# Patient Record
Sex: FEMALE | Race: OTHER | HISPANIC OR LATINO | Employment: FULL TIME | URBAN - METROPOLITAN AREA
[De-identification: names, ages, dates, MRNs, and addresses within clinical notes are randomized per-mention and may not be internally consistent; named-entity substitution may affect disease eponyms.]

---

## 2019-11-14 ENCOUNTER — OFFICE VISIT (OUTPATIENT)
Dept: PULMONOLOGY | Facility: MEDICAL CENTER | Age: 39
End: 2019-11-14
Payer: COMMERCIAL

## 2019-11-14 VITALS
BODY MASS INDEX: 37.15 KG/M2 | DIASTOLIC BLOOD PRESSURE: 82 MMHG | SYSTOLIC BLOOD PRESSURE: 122 MMHG | HEIGHT: 65 IN | HEART RATE: 78 BPM | OXYGEN SATURATION: 97 % | TEMPERATURE: 97.7 F | WEIGHT: 223 LBS | RESPIRATION RATE: 13 BRPM

## 2019-11-14 DIAGNOSIS — J45.30 MILD PERSISTENT ASTHMA WITHOUT COMPLICATION: Primary | ICD-10-CM

## 2019-11-14 DIAGNOSIS — R06.02 SOB (SHORTNESS OF BREATH): ICD-10-CM

## 2019-11-14 DIAGNOSIS — R06.2 WHEEZE: ICD-10-CM

## 2019-11-14 DIAGNOSIS — J30.1 SEASONAL ALLERGIC RHINITIS DUE TO POLLEN: ICD-10-CM

## 2019-11-14 PROCEDURE — 99204 OFFICE O/P NEW MOD 45 MIN: CPT | Performed by: INTERNAL MEDICINE

## 2019-11-14 PROCEDURE — 94010 BREATHING CAPACITY TEST: CPT | Performed by: INTERNAL MEDICINE

## 2019-11-14 PROCEDURE — 94640 AIRWAY INHALATION TREATMENT: CPT | Performed by: INTERNAL MEDICINE

## 2019-11-14 RX ORDER — MOMETASONE FUROATE 50 UG/1
2 SPRAY, METERED NASAL
Qty: 1 ACT | Refills: 7 | Status: SHIPPED | OUTPATIENT
Start: 2019-11-14 | End: 2020-08-14 | Stop reason: SDUPTHER

## 2019-11-14 RX ORDER — ALBUTEROL SULFATE 2.5 MG/3ML
2.5 SOLUTION RESPIRATORY (INHALATION) ONCE
Status: COMPLETED | OUTPATIENT
Start: 2019-11-14 | End: 2019-11-14

## 2019-11-14 RX ORDER — ALBUTEROL SULFATE 2.5 MG/3ML
2.5 SOLUTION RESPIRATORY (INHALATION) EVERY 4 HOURS PRN
Qty: 120 VIAL | Refills: 5 | Status: SHIPPED | OUTPATIENT
Start: 2019-11-14 | End: 2019-12-14

## 2019-11-14 RX ORDER — ALBUTEROL SULFATE 90 UG/1
AEROSOL, METERED RESPIRATORY (INHALATION)
COMMUNITY
Start: 2019-06-10 | End: 2020-08-14 | Stop reason: SDUPTHER

## 2019-11-14 RX ORDER — DESLORATADINE 5 MG/1
5 TABLET ORAL DAILY PRN
Qty: 30 TABLET | Refills: 5 | Status: SHIPPED | OUTPATIENT
Start: 2019-11-14 | End: 2020-08-14 | Stop reason: SDUPTHER

## 2019-11-14 RX ADMIN — ALBUTEROL SULFATE 2.5 MG: 2.5 SOLUTION RESPIRATORY (INHALATION) at 09:50

## 2019-11-14 NOTE — ASSESSMENT & PLAN NOTE
Mild persistent asthma with increased wheezing and need for inhaler therapy over the past month  This may be triggered by allergies  No evidence of acute infection  She has been on QVAR 80 mcg 2 puffs b i d  But she does not feel this is been effective for her and she has been needing use a Ventolin inhaler several times a day  Spirometry today shows normal lung volumes  Peak flow rate today was 500 liters/minute pre bronchodilator and predicted is 480 liters/minute  After nebulizer treatment with 2 5 mg of albuterol this increased to 550 liters/minute  Also she had faint wheezing in the lower lobes prior to bronchodilator and after the nebulizer treatment this resolved and she felt she can take a deeper breath    I discontinued her QVAR and will switch her to Advair 113 mcg MDI - 2 puffs twice a day  I did advise her to rinse her mouth after use  She prefers meter dose inhalers as opposed to powdered inhalers    I did order her a nebulizer and she can use 2 5 mg albuterol every 4 hours when needed or she will use her Ventolin inhaler  She does not recall being on, steroid therapy recently  I did give her option of that if she was not improving I could give her a short course of prednisone therapy  This time she she did not feel she needed it  If she does not improve her changes on my she will contact our office    Follow-up in 6 weeks  I did provide her a peak flow meter to monitor her peak flows at home    I also instructed her in how to use the peak flow meter

## 2019-11-14 NOTE — ASSESSMENT & PLAN NOTE
There is some audible wheezing in the lower lobes today  I gave nebulized treatment with albuterol 2 5 mg and this resolved  I did order her a home nebulizer and albuterol for the nebulizer  When she has any increased shortness of breath at home between nighttime she can use the nebulizer

## 2019-11-14 NOTE — PATIENT INSTRUCTIONS
Stopped using QVAR    Start Advair 113 mcg inhaler - 2 puffs twice a day  Use Ventolin inhaler 2 puffs every 4 hours as needed    Peak flow meter  Can check peak flows if you have any shortness of breath  Predicted peak flow is 480 liters/minute  Today your peak flow was 500      After the nebulizer treatment with albuterol the peak flow rate improved to 550    I will order nebulizer that you can use 1 vial of albuterol every 4 hours as needed if you have wheezing or difficulty breathing    I ordered nebulizer from [facility]     Take Clarinex 1 tablet daily as needed for allergies    Use Nasonex nasal spray 2 sprays in each nostril at bedtime for nasal congestion and allergies

## 2019-11-14 NOTE — PROGRESS NOTES
Assessment/Plan:       Problem List Items Addressed This Visit        Respiratory    Mild persistent asthma without complication - Primary     Mild persistent asthma with increased wheezing and need for inhaler therapy over the past month  This may be triggered by allergies  No evidence of acute infection  She has been on QVAR 80 mcg 2 puffs b i d  But she does not feel this is been effective for her and she has been needing use a Ventolin inhaler several times a day  Spirometry today shows normal lung volumes  Peak flow rate today was 500 liters/minute pre bronchodilator and predicted is 480 liters/minute  After nebulizer treatment with 2 5 mg of albuterol this increased to 550 liters/minute  Also she had faint wheezing in the lower lobes prior to bronchodilator and after the nebulizer treatment this resolved and she felt she can take a deeper breath    I discontinued her QVAR and will switch her to Advair 113 mcg MDI - 2 puffs twice a day  I did advise her to rinse her mouth after use  She prefers meter dose inhalers as opposed to powdered inhalers    I did order her a nebulizer and she can use 2 5 mg albuterol every 4 hours when needed or she will use her Ventolin inhaler  She does not recall being on, steroid therapy recently  I did give her option of that if she was not improving I could give her a short course of prednisone therapy  This time she she did not feel she needed it  If she does not improve her changes on my she will contact our office    Follow-up in 6 weeks  I did provide her a peak flow meter to monitor her peak flows at home    I also instructed her in how to use the peak flow meter         Relevant Medications    albuterol (VENTOLIN HFA) 90 mcg/act inhaler    albuterol inhalation solution 2 5 mg (Completed)    fluticasone-salmeterol (ADVAIR HFA) 115-21 MCG/ACT inhaler    albuterol (2 5 mg/3 mL) 0 083 % nebulizer solution    Other Relevant Orders    Nebulizer    Seasonal allergic rhinitis due to pollen     I prescribed Nasonex nasal spray 2 sprays each nostril as needed for her nasal congestion and postnasal drainage  She has used fluticasone nasal spray in the past with benefit  Also I did prescribe Clarinex 5 mg 1 tablet daily  She was uses as needed for allergy symptoms  Relevant Medications    desloratadine (CLARINEX) 5 MG tablet    mometasone (NASONEX) 50 mcg/act nasal spray       Other    Wheeze     There is some audible wheezing in the lower lobes today  I gave nebulized treatment with albuterol 2 5 mg and this resolved  I did order her a home nebulizer and albuterol for the nebulizer  When she has any increased shortness of breath at home between nighttime she can use the nebulizer  Relevant Medications    albuterol inhalation solution 2 5 mg (Completed)      Other Visit Diagnoses     SOB (shortness of breath)        Relevant Orders    POCT spirometry            Return in about 6 weeks (around 12/26/2019)  All questions are answered to the patient's satisfaction and understanding  She verbalizes understanding  She is encouraged to call with any further questions or concerns  Portions of the record may have been created with voice recognition software  Occasional wrong word or "sound a like" substitutions may have occurred due to the inherent limitations of voice recognition software  Read the chart carefully and recognize, using context, where substitutions have occurred  a    Electronically Signed by Samra November, DO    ______________________________________________________________________    Chief Complaint:   Chief Complaint   Patient presents with    Asthma     pt is here for today for consult     Shortness of Breath     pt states very  bad   Cough     dry     Wheezing        Patient ID: Anai Mendez is a 42 y o  y o  female has a past medical history of Asthma  11/14/2019  Patient presents today for initial visit    HPI    Anai Agent presents for initial evaluation for her asthma  She spent weeks very little initial and she was accompanied by her  today who helped translate  She is from [facility]  She states she has had asthma since she was 12years old  She has always been on some inhalers  Recently over the past month she has had increased wheezing and shortness of Breath intermittently  She has been using her Ventolin inhaler now several times per day over the past week or more  She does have seasonal allergies and is having some nasal congestion and postnasal drainage  She also is waking up at night now with some wheezing and sometimes shortness of breath  She does have periodic nonproductive cough  She does have fullness sensation in her ears  She used to own a dog and denies any allergies to dogs  No allergies to cats  She is having some mild shortness of breath with exertional activity  She presently is using QVAR 80 mcg 2 puffs twice a day  She does have the meter dose version acute far this was prescribed year ago  She also has a Ventolin inhaler  She does not have a nebulizer  She denies any history of heart disease  No history of kidney disease or diabetes mellitus  She denies any excessive daytime somnolence  Pets/Enviroment:  No pets    Her primary physician is in [place] her at [employer] care    Review of Systems   Constitutional: Negative for activity change, appetite change, fatigue and fever  HENT: Positive for congestion and postnasal drip  Eyes: Positive for itching  Respiratory: Positive for shortness of breath and wheezing  Cardiovascular: Negative for chest pain, palpitations and leg swelling  Gastrointestinal: Negative for abdominal distention and abdominal pain  Endocrine: Negative for polydipsia and polyphagia  Musculoskeletal: Negative for joint swelling  Neurological: Negative for light-headedness  Psychiatric/Behavioral: Negative for decreased concentration  Social history: She reports that she has never smoked  She has never used smokeless tobacco  She reports that she drinks alcohol  She reports that she does not use drugs  Past surgical history:   Past Surgical History:   Procedure Laterality Date    APPENDECTOMY       SECTION       x2    LAPAROSCOPIC OVARIAN CYSTECTOMY       Family history:   Family History   Problem Relation Age of Onset    Hypertension Mother     Diabetes Mother     Asthma Mother     Hypertension Father     Diabetes Father          There is no immunization history on file for this patient  Current Outpatient Medications   Medication Sig Dispense Refill    albuterol (VENTOLIN HFA) 90 mcg/act inhaler inhale 2 puffs by mouth every 4 hours      albuterol (2 5 mg/3 mL) 0 083 % nebulizer solution Take 1 vial (2 5 mg total) by nebulization every 4 (four) hours as needed for wheezing or shortness of breath 120 vial 5    desloratadine (CLARINEX) 5 MG tablet Take 1 tablet (5 mg total) by mouth daily as needed (Allergies) 30 tablet 5    fluticasone-salmeterol (ADVAIR HFA) 115-21 MCG/ACT inhaler Inhale 2 puffs 2 (two) times a day Rinse mouth after use  1 Inhaler 7    mometasone (NASONEX) 50 mcg/act nasal spray 2 sprays into each nostril daily at bedtime as needed (Nasal congestion or postnasal drainage) 1 Act 7     No current facility-administered medications for this visit  Allergies: Patient has no known allergies  Objective:  Vitals:    19 0905   BP: 122/82   BP Location: Left arm   Patient Position: Sitting   Cuff Size: Extra-Large   Pulse: 78   Resp: 13   Temp: 97 7 °F (36 5 °C)   TempSrc: Tympanic   SpO2: 97%   Weight: 101 kg (223 lb)   Height: 5' 5 25" (1 657 m)   Oxygen Therapy  SpO2: 97 %    Wt Readings from Last 3 Encounters:   19 101 kg (223 lb)     Body mass index is 36 83 kg/m²  Physical Exam   Constitutional: She is oriented to person, place, and time   She appears well-developed and well-nourished  No distress  overweight   HENT:   Head: Normocephalic  Nose: Nose normal    Mouth/Throat: Oropharynx is clear and moist  No oropharyngeal exudate  To manic membranes normal in color  Small amount of fluid behind right ear drum  Mild cobblestoning of mucosa of pharnx   Eyes: Pupils are equal, round, and reactive to light  Conjunctivae are normal    Neck: Neck supple  No JVD present  No tracheal deviation present  Cardiovascular: Normal rate, regular rhythm and normal heart sounds  Pulmonary/Chest: Effort normal    There are some faint expiratory wheezes in the lower lobes  No crackles or rhonchi   Abdominal: Soft  She exhibits no distension  There is no tenderness  There is no guarding  Musculoskeletal: She exhibits no edema  No edema of lower extremities   Lymphadenopathy:     She has no cervical adenopathy  Neurological: She is alert and oriented to person, place, and time  Skin: Skin is warm and dry  No rash noted  Psychiatric: She has a normal mood and affect   Her behavior is normal  Thought content normal      Mini neb  Performed by: Mark Navarro DO  Authorized by: Mark Navarro DO     Treatment 1:   Pre-Procedure     Symptoms:  Wheezing    Lung Sounds:  Faint wheezing in lower lobes    HR:  70    RR:  12    SP02:  98%    Medication Administered:  Albuterol 2 5 mg  Post-Procedure     Lung sounds:  Lung sounds were clear    HR:  70    RR:  12    SP02:  98%      Peak flow rate was 500 liters/minute before nebulized treatment with albuterol and after nebulizer treatment was 550 liters/minute      Office Spirometry Results:  Pre bronchodilator  FVC - 3 54 L   92%  FEV1 - 3 38 L  106%  FEV1/FVC% - 96%

## 2019-11-14 NOTE — ASSESSMENT & PLAN NOTE
I prescribed Nasonex nasal spray 2 sprays each nostril as needed for her nasal congestion and postnasal drainage  She has used fluticasone nasal spray in the past with benefit  Also I did prescribe Clarinex 5 mg 1 tablet daily  She was uses as needed for allergy symptoms

## 2020-01-06 ENCOUNTER — OFFICE VISIT (OUTPATIENT)
Dept: PULMONOLOGY | Facility: MEDICAL CENTER | Age: 40
End: 2020-01-06
Payer: COMMERCIAL

## 2020-01-06 VITALS
BODY MASS INDEX: 28.66 KG/M2 | HEART RATE: 84 BPM | SYSTOLIC BLOOD PRESSURE: 128 MMHG | RESPIRATION RATE: 12 BRPM | HEIGHT: 65 IN | DIASTOLIC BLOOD PRESSURE: 72 MMHG | OXYGEN SATURATION: 98 % | TEMPERATURE: 97.7 F | WEIGHT: 172 LBS

## 2020-01-06 DIAGNOSIS — J20.9 ACUTE BRONCHITIS, UNSPECIFIED ORGANISM: Primary | ICD-10-CM

## 2020-01-06 DIAGNOSIS — J45.30 MILD PERSISTENT ASTHMA WITHOUT COMPLICATION: ICD-10-CM

## 2020-01-06 DIAGNOSIS — R05.9 COUGH: ICD-10-CM

## 2020-01-06 DIAGNOSIS — R06.2 WHEEZE: ICD-10-CM

## 2020-01-06 PROCEDURE — 94640 AIRWAY INHALATION TREATMENT: CPT | Performed by: INTERNAL MEDICINE

## 2020-01-06 PROCEDURE — 99214 OFFICE O/P EST MOD 30 MIN: CPT | Performed by: INTERNAL MEDICINE

## 2020-01-06 RX ORDER — PREDNISONE 20 MG/1
20 TABLET ORAL 2 TIMES DAILY WITH MEALS
Qty: 10 TABLET | Refills: 0 | Status: SHIPPED | OUTPATIENT
Start: 2020-01-06 | End: 2020-01-06 | Stop reason: ALTCHOICE

## 2020-01-06 RX ORDER — IPRATROPIUM BROMIDE AND ALBUTEROL SULFATE 2.5; .5 MG/3ML; MG/3ML
3 SOLUTION RESPIRATORY (INHALATION) ONCE
Status: COMPLETED | OUTPATIENT
Start: 2020-01-06 | End: 2020-01-06

## 2020-01-06 RX ORDER — AZITHROMYCIN 250 MG/1
TABLET, FILM COATED ORAL
Qty: 6 TABLET | Refills: 0 | Status: SHIPPED | OUTPATIENT
Start: 2020-01-06 | End: 2020-01-10

## 2020-01-06 RX ORDER — PREDNISONE 20 MG/1
20 TABLET ORAL DAILY
Qty: 10 TABLET | Refills: 0 | Status: SHIPPED | OUTPATIENT
Start: 2020-01-06 | End: 2020-01-06

## 2020-01-06 RX ADMIN — IPRATROPIUM BROMIDE AND ALBUTEROL SULFATE 3 ML: 2.5; .5 SOLUTION RESPIRATORY (INHALATION) at 16:01

## 2020-01-06 NOTE — PROGRESS NOTES
Assessment/Plan        Problem List Items Addressed This Visit        Respiratory    Mild persistent asthma without complication     She is doing well with Advair  mcg will continue 2 puffs b i d  She can use her new albuterol inhaler 2 puffs every 4 hours as needed  This time she does not appear to need any oral corticosteroid therapy  She does not improve she will contact our office  She does have a peak flow meter at home to monitor her peak flow rates         Acute bronchitis, unspecified - Primary     I prescribed a 5 day course of azithromycin for her acute bronchitis  She will contact our office if she has no improvement            Other    Wheeze     She had mild expiratory wheezes in both bases  I did administer nebulized treatment DuoNeb with improvement             Relevant Orders    Mini neb (Completed)      Other Visit Diagnoses     Cough                Shortness of Breath ( very bad ); Cough (productive ); and Wheezing      HPI     Patient presents with one week duration of URI symptoms including productive cough, chills, sore throat, subjective fever  Patient tried Nyquil with partial relief  Patient does have mild persistent asthma  She is now using Advair  mcg 2 puffs b i d  Previously she had use QVAR  She prefers meter dose inhalers as compared to powdered inhalers  She also has history of seasonal allergic rhinitis  She does have Clarinex Nasonex she can use for this when needed  She was accompanied by her  today  She speaks mostly [place]  She is from the [facility]  He is having some mild shortness of breath with activity recently since her URI  Prior to that she had been doing very well on Advair inhaler  No fever 0r chills  She also complains some slight year discomfort bilaterally  Patient received Duoneb x1 at clinic today      Past Medical History:   Diagnosis Date    Asthma        Past Surgical History:   Procedure Laterality Date  APPENDECTOMY       SECTION       x2    LAPAROSCOPIC OVARIAN CYSTECTOMY           Current Outpatient Medications:     albuterol (VENTOLIN HFA) 90 mcg/act inhaler, inhale 2 puffs by mouth every 4 hours, Disp: , Rfl:     desloratadine (CLARINEX) 5 MG tablet, Take 1 tablet (5 mg total) by mouth daily as needed (Allergies) (Patient not taking: Reported on 2020), Disp: 30 tablet, Rfl: 5    fluticasone-salmeterol (ADVAIR HFA) 115-21 MCG/ACT inhaler, Inhale 2 puffs 2 (two) times a day Rinse mouth after use , Disp: 1 Inhaler, Rfl: 7    mometasone (NASONEX) 50 mcg/act nasal spray, 2 sprays into each nostril daily at bedtime as needed (Nasal congestion or postnasal drainage) (Patient not taking: Reported on 2020), Disp: 1 Act, Rfl: 7    cloNIDine (CATAPRES) 0 1 mg tablet, Take 0 1 mg by mouth 2 (two) times a day, Disp: , Rfl:     enoxaparin (LOVENOX) 40 mg/0 4 mL, , Disp: , Rfl:     ipratropium-albuterol (DUO-NEB) 0 5-2 5 mg/3 mL nebulizer solution, Take 3 mL by nebulization 4 (four) times a day, Disp: , Rfl:     ondansetron (ZOFRAN-ODT) 4 mg disintegrating tablet, , Disp: , Rfl:     pantoprazole (PROTONIX) 40 mg tablet, Take 40 mg by mouth, Disp: , Rfl:     scopolamine (TRANSDERM-SCOP) 1 5 mg/3 days TD 72 hr patch, , Disp: , Rfl:     No Known Allergies    Social History     Tobacco Use    Smoking status: Never Smoker    Smokeless tobacco: Never Used   Substance Use Topics    Alcohol use: Yes         Family History   Problem Relation Age of Onset    Hypertension Mother     Diabetes Mother     Asthma Mother     Hypertension Father     Diabetes Father        Review of Systems   Constitutional: Positive for chills  Negative for activity change, appetite change and fever  HENT: Positive for congestion and sore throat  Mild discomfort in her ears   Eyes: Negative for redness  Respiratory: Positive for cough and wheezing      Cardiovascular: Negative for chest pain and leg swelling  Gastrointestinal: Negative for abdominal distention and abdominal pain  Endocrine: Negative for polydipsia and polyphagia  Genitourinary: Negative for dysuria  Musculoskeletal: Negative for arthralgias  Neurological: Negative for light-headedness  Vitals:    01/06/20 1533   BP: 128/72   Pulse: 84   Resp: 12   Temp: 97 7 °F (36 5 °C)   SpO2: 98%             Physical Exam   Constitutional: She is oriented to person, place, and time  She appears well-developed and well-nourished  No distress  HENT:   Head: Normocephalic  Nose: Nose normal    Mouth/Throat: Oropharynx is clear and moist  No oropharyngeal exudate  TM non-bulging, light reflex present b/l  Eyes: Pupils are equal, round, and reactive to light  Conjunctivae are normal    Cardiovascular: Normal rate, regular rhythm and normal heart sounds  Pulmonary/Chest: Effort normal    Expiratory wheezing in bilateral basal lung fields  Abdominal: Soft  She exhibits no distension  There is no tenderness  Musculoskeletal:   No edema   Neurological: She is alert and oriented to person, place, and time  Skin: Skin is warm and dry  Psychiatric: She has a normal mood and affect         Mini neb  Performed by: Karen Luna DO  Authorized by: Karen Luna DO     Number of treatments:  1  Treatment 1:   Pre-Procedure     Symptoms:  Wheezing and cough    Lung Sounds:  Few faint expiratory wheezes in lower lobes    HR:  70    RR:  14    SP02:  97%    Medication Administered:  Duoneb - Albuterol 2 5 mg/Atrovent 0 5 mg  Post-Procedure     Lung sounds:  Clear    HR:  72    RR:  14

## 2020-01-14 PROBLEM — J20.9 ACUTE BRONCHITIS, UNSPECIFIED: Status: ACTIVE | Noted: 2020-01-14

## 2020-01-15 NOTE — ASSESSMENT & PLAN NOTE
I prescribed a 5 day course of azithromycin for her acute bronchitis    She will contact our office if she has no improvement

## 2020-01-15 NOTE — ASSESSMENT & PLAN NOTE
She is doing well with Advair  mcg will continue 2 puffs b i d  She can use her new albuterol inhaler 2 puffs every 4 hours as needed  This time she does not appear to need any oral corticosteroid therapy  She does not improve she will contact our office    She does have a peak flow meter at home to monitor her peak flow rates

## 2020-01-15 NOTE — ASSESSMENT & PLAN NOTE
She had mild expiratory wheezes in both bases    I did administer nebulized treatment DuoNeb with improvement

## 2020-08-14 ENCOUNTER — OFFICE VISIT (OUTPATIENT)
Dept: PULMONOLOGY | Facility: MEDICAL CENTER | Age: 40
End: 2020-08-14
Payer: COMMERCIAL

## 2020-08-14 VITALS
HEIGHT: 65 IN | SYSTOLIC BLOOD PRESSURE: 100 MMHG | RESPIRATION RATE: 12 BRPM | BODY MASS INDEX: 30.82 KG/M2 | HEART RATE: 72 BPM | WEIGHT: 185 LBS | OXYGEN SATURATION: 98 % | DIASTOLIC BLOOD PRESSURE: 64 MMHG | TEMPERATURE: 98.7 F

## 2020-08-14 DIAGNOSIS — J30.1 SEASONAL ALLERGIC RHINITIS DUE TO POLLEN: ICD-10-CM

## 2020-08-14 DIAGNOSIS — E66.09 CLASS 1 OBESITY DUE TO EXCESS CALORIES WITHOUT SERIOUS COMORBIDITY WITH BODY MASS INDEX (BMI) OF 30.0 TO 30.9 IN ADULT: ICD-10-CM

## 2020-08-14 DIAGNOSIS — J45.30 MILD PERSISTENT ASTHMA WITHOUT COMPLICATION: Primary | ICD-10-CM

## 2020-08-14 PROCEDURE — 99214 OFFICE O/P EST MOD 30 MIN: CPT | Performed by: INTERNAL MEDICINE

## 2020-08-14 RX ORDER — ALBUTEROL SULFATE 90 UG/1
2 AEROSOL, METERED RESPIRATORY (INHALATION) EVERY 4 HOURS
Qty: 18 G | Refills: 11 | Status: SHIPPED | OUTPATIENT
Start: 2020-08-14 | End: 2020-08-14

## 2020-08-14 RX ORDER — PANTOPRAZOLE SODIUM 40 MG/1
40 TABLET, DELAYED RELEASE ORAL
COMMUNITY
Start: 2020-06-04 | End: 2021-06-04

## 2020-08-14 RX ORDER — IPRATROPIUM BROMIDE AND ALBUTEROL SULFATE 2.5; .5 MG/3ML; MG/3ML
3 SOLUTION RESPIRATORY (INHALATION) 4 TIMES DAILY
COMMUNITY

## 2020-08-14 RX ORDER — SCOLOPAMINE TRANSDERMAL SYSTEM 1 MG/1
PATCH, EXTENDED RELEASE TRANSDERMAL
COMMUNITY
Start: 2020-06-01

## 2020-08-14 RX ORDER — MOMETASONE FUROATE 50 UG/1
2 SPRAY, METERED NASAL
Qty: 1 ACT | Refills: 7 | Status: SHIPPED | OUTPATIENT
Start: 2020-08-14

## 2020-08-14 RX ORDER — ONDANSETRON 4 MG/1
TABLET, ORALLY DISINTEGRATING ORAL
COMMUNITY
Start: 2020-05-30

## 2020-08-14 RX ORDER — ALBUTEROL SULFATE 90 UG/1
2 AEROSOL, METERED RESPIRATORY (INHALATION) EVERY 4 HOURS
Qty: 18 G | Refills: 11 | Status: SHIPPED | OUTPATIENT
Start: 2020-08-14

## 2020-08-14 RX ORDER — CLONIDINE HYDROCHLORIDE 0.1 MG/1
0.1 TABLET ORAL 2 TIMES DAILY
COMMUNITY
Start: 2020-03-04

## 2020-08-14 RX ORDER — DESLORATADINE 5 MG/1
5 TABLET ORAL DAILY PRN
Qty: 30 TABLET | Refills: 5 | Status: SHIPPED | OUTPATIENT
Start: 2020-08-14

## 2020-08-14 NOTE — PROGRESS NOTES
Assessment/Plan        Problem List Items Addressed This Visit        Respiratory    Mild persistent asthma without complication - Primary     Continue Advair 115 mcg 2 puffs b i d  Her asthma seems to be well controlled at this time  Lung sounds were clear today  She states she had difficulty wearing mask at her [employer] workplace she gets hot from it  I told her I could not give any specific medical reason why she could not work because of this  She would like to take a leave of absence however at for company water loud I did a give a no supporting this         Relevant Medications    ipratropium-albuterol (DUO-NEB) 0 5-2 5 mg/3 mL nebulizer solution    fluticasone-salmeterol (ADVAIR HFA) 115-21 MCG/ACT inhaler    albuterol (Ventolin HFA) 90 mcg/act inhaler    Seasonal allergic rhinitis due to pollen     She does use Nasonex periodically for her allergic rhinitis  Relevant Medications    desloratadine (CLARINEX) 5 MG tablet    mometasone (NASONEX) 50 mcg/act nasal spray       Other    Class 1 obesity due to excess calories without serious comorbidity with body mass index (BMI) of 30 0 to 30 9 in adult     She did have laparoscopic sleeve gastrectomy done for weight loss on Daria 3, 2020 and had liver biopsy done at [facility] in [place], [place]  Shortness of Breath (while wearing mask)      HPI      Patient is presenting with a complaint regarding difficult wearing a mask at work  Patient works in packaging at [employer]  She states after a while she starts to feel short of breath while she wears her mask  She was accompanied by her  who helped translate for her as patient's English limited    Yamileth Heath has mild persistent asthma and is using Advair  mcg 2 puffs b i d  And albuterol inhaler as needed  She is not have any wheezing or nocturnal dyspnea  No recent asthma exacerbations      She did undergo laparoscopic sleeve gastrectomy on Daria 3, 2020 also had liver biopsy done  This was done at [facility] in [place], [place]  Liver biopsy was unremarkable      Past Medical History:   Diagnosis Date    Asthma        Past Surgical History:   Procedure Laterality Date    APPENDECTOMY       SECTION       x2    GASTRECTOMY SLEEVE LAPAROSCOPIC  2020    Done at [facility] in [place], [address] [place] [address]           Current Outpatient Medications:     albuterol (Ventolin HFA) 90 mcg/act inhaler, Inhale 2 puffs every 4 (four) hours, Disp: 18 g, Rfl: 11    fluticasone-salmeterol (ADVAIR HFA) 115-21 MCG/ACT inhaler, Inhale 2 puffs 2 (two) times a day Rinse mouth after use , Disp: 1 Inhaler, Rfl: 11    ipratropium-albuterol (DUO-NEB) 0 5-2 5 mg/3 mL nebulizer solution, Take 3 mL by nebulization 4 (four) times a day, Disp: , Rfl:     cloNIDine (CATAPRES) 0 1 mg tablet, Take 0 1 mg by mouth 2 (two) times a day, Disp: , Rfl:     desloratadine (CLARINEX) 5 MG tablet, Take 1 tablet (5 mg total) by mouth daily as needed (Allergies), Disp: 30 tablet, Rfl: 5    enoxaparin (LOVENOX) 40 mg/0 4 mL, , Disp: , Rfl:     mometasone (NASONEX) 50 mcg/act nasal spray, 2 sprays into each nostril daily at bedtime as needed (Nasal congestion or postnasal drainage), Disp: 1 Act, Rfl: 7    ondansetron (ZOFRAN-ODT) 4 mg disintegrating tablet, , Disp: , Rfl:     pantoprazole (PROTONIX) 40 mg tablet, Take 40 mg by mouth, Disp: , Rfl:     scopolamine (TRANSDERM-SCOP) 1 5 mg/3 days TD 72 hr patch, , Disp: , Rfl:     No Known Allergies    Social History     Tobacco Use    Smoking status: Never Smoker    Smokeless tobacco: Never Used   Substance Use Topics    Alcohol use: Yes         Family History   Problem Relation Age of Onset    Hypertension Mother     Diabetes Mother     Asthma Mother     Hypertension Father     Diabetes Father        Review of Systems   Constitutional: Negative for chills, fever and unexpected weight change  HENT: Negative for congestion, rhinorrhea and sore throat  Eyes: Negative for discharge and redness  Respiratory: Negative for cough and wheezing  Cardiovascular: Negative for chest pain, palpitations and leg swelling  Gastrointestinal: Negative for abdominal distention, abdominal pain and nausea  Endocrine: Negative for polydipsia and polyphagia  Genitourinary: Negative for dysuria and hematuria  Musculoskeletal: Negative for joint swelling and myalgias  Skin: Negative for rash  Neurological: Negative for light-headedness  Psychiatric/Behavioral: Negative for decreased concentration  Vitals:    08/14/20 0931   BP: 100/64   Pulse: 72   Resp: 12   Temp: 98 7 °F (37 1 °C)   SpO2: 98%           Physical Exam  Constitutional:       General: She is not in acute distress  Appearance: She is well-developed  HENT:      Head: Normocephalic  Nose: Nose normal       Mouth/Throat:      Pharynx: No oropharyngeal exudate  Eyes:      Conjunctiva/sclera: Conjunctivae normal       Pupils: Pupils are equal, round, and reactive to light  Neck:      Musculoskeletal: Neck supple  Vascular: No JVD  Trachea: No tracheal deviation  Cardiovascular:      Rate and Rhythm: Normal rate and regular rhythm  Heart sounds: Normal heart sounds  Pulmonary:      [place]: Pulmonary effort is normal       Comments: Lung sounds are clear without any wheezes, crackles or rhonchi  Abdominal:      General: There is no distension  Palpations: Abdomen is soft  Tenderness: There is no abdominal tenderness  Musculoskeletal:      Comments: No edema, cyanosis or clubbing   Lymphadenopathy:      Cervical: No cervical adenopathy  Skin:     General: Skin is warm and dry  Neurological:      Mental Status: She is alert and oriented to person, place, and time

## 2020-08-15 PROBLEM — E66.09 CLASS 1 OBESITY DUE TO EXCESS CALORIES WITHOUT SERIOUS COMORBIDITY WITH BODY MASS INDEX (BMI) OF 30.0 TO 30.9 IN ADULT: Status: ACTIVE | Noted: 2020-08-15

## 2020-08-16 NOTE — ASSESSMENT & PLAN NOTE
Continue Advair 115 mcg 2 puffs b i d  Her asthma seems to be well controlled at this time  Lung sounds were clear today  She states she had difficulty wearing mask at her [employer] workplace she gets hot from it  I told her I could not give any specific medical reason why she could not work because of this    She would like to take a leave of absence however at for [employer] water loud I did a give a no supporting this

## 2020-08-16 NOTE — ASSESSMENT & PLAN NOTE
She did have laparoscopic sleeve gastrectomy done for weight loss on Daria 3, 2020 and had liver biopsy done at [facility] in [place], [place]

## 2022-06-20 ENCOUNTER — APPOINTMENT (EMERGENCY)
Dept: RADIOLOGY | Facility: HOSPITAL | Age: 42
End: 2022-06-20
Payer: OTHER MISCELLANEOUS

## 2022-06-20 ENCOUNTER — HOSPITAL ENCOUNTER (EMERGENCY)
Facility: HOSPITAL | Age: 42
Discharge: HOME | End: 2022-06-20
Attending: EMERGENCY MEDICINE
Payer: OTHER MISCELLANEOUS

## 2022-06-20 VITALS
BODY MASS INDEX: 29.86 KG/M2 | HEART RATE: 76 BPM | DIASTOLIC BLOOD PRESSURE: 81 MMHG | OXYGEN SATURATION: 100 % | SYSTOLIC BLOOD PRESSURE: 121 MMHG | HEIGHT: 66 IN | TEMPERATURE: 98.3 F | RESPIRATION RATE: 18 BRPM

## 2022-06-20 DIAGNOSIS — M25.539 WRIST PAIN: Primary | ICD-10-CM

## 2022-06-20 PROCEDURE — 99283 EMERGENCY DEPT VISIT LOW MDM: CPT

## 2022-06-20 PROCEDURE — 99284 EMERGENCY DEPT VISIT MOD MDM: CPT | Performed by: EMERGENCY MEDICINE

## 2022-06-20 PROCEDURE — 96372 THER/PROPH/DIAG INJ SC/IM: CPT

## 2022-06-20 PROCEDURE — 73110 X-RAY EXAM OF WRIST: CPT

## 2022-06-20 RX ORDER — KETOROLAC TROMETHAMINE 30 MG/ML
30 INJECTION, SOLUTION INTRAMUSCULAR; INTRAVENOUS ONCE
Status: COMPLETED | OUTPATIENT
Start: 2022-06-20 | End: 2022-06-20

## 2022-06-20 RX ORDER — IBUPROFEN 800 MG/1
800 TABLET ORAL EVERY 8 HOURS PRN
Qty: 12 TABLET | Refills: 0 | Status: SHIPPED | OUTPATIENT
Start: 2022-06-20

## 2022-06-20 RX ADMIN — KETOROLAC TROMETHAMINE 30 MG: 30 INJECTION, SOLUTION INTRAMUSCULAR at 12:54

## 2022-06-20 NOTE — ED PROVIDER NOTES
History  Chief Complaint   Patient presents with    Wrist Pain     [facility]  #[id number] - Pt presents to the ED from home with complaint of left wrist pain; states "I had an accident at my job lifting boxes"; states was lifting boxes that "were very heavy"; nothing OTC for pain     To er with left wrist pain from moving a heavy box that occurred at work  Sx onset this AM  She denies swelling, open wounds, weakness, sensation changes  No redness or fall  States pain is ant and post wrist  Pain only with movement, non at rest            Prior to Admission Medications   Prescriptions Last Dose Informant Patient Reported? Taking? albuterol (Ventolin HFA) 90 mcg/act inhaler   No No   Sig: Inhale 2 puffs every 4 (four) hours   cloNIDine (CATAPRES) 0 1 mg tablet  Self Yes No   Sig: Take 0 1 mg by mouth 2 (two) times a day   desloratadine (CLARINEX) 5 MG tablet   No No   Sig: Take 1 tablet (5 mg total) by mouth daily as needed (Allergies)   enoxaparin (LOVENOX) 40 mg/0 4 mL  Self Yes No   fluticasone-salmeterol (ADVAIR HFA) 115-21 MCG/ACT inhaler   No No   Sig: Inhale 2 puffs 2 (two) times a day Rinse mouth after use     ipratropium-albuterol (DUO-NEB) 0 5-2 5 mg/3 mL nebulizer solution  Self Yes No   Sig: Take 3 mL by nebulization 4 (four) times a day   mometasone (NASONEX) 50 mcg/act nasal spray   No No   Si sprays into each nostril daily at bedtime as needed (Nasal congestion or postnasal drainage)   ondansetron (ZOFRAN-ODT) 4 mg disintegrating tablet  Self Yes No   pantoprazole (PROTONIX) 40 mg tablet  Self Yes No   Sig: Take 40 mg by mouth   scopolamine (TRANSDERM-SCOP) 1 5 mg/3 days TD 72 hr patch  Self Yes No      Facility-Administered Medications: None       Past Medical History:   Diagnosis Date    Asthma        Past Surgical History:   Procedure Laterality Date    APPENDECTOMY       SECTION       x2    GASTRECTOMY SLEEVE LAPAROSCOPIC  2020    [facility] [place]    LAPAROSCOPIC OVARIAN CYSTECTOMY         Family History   Problem Relation Age of Onset    Hypertension Mother     Diabetes Mother     Asthma Mother     Hypertension Father     Diabetes Father      I have reviewed and agree with the history as documented  E-Cigarette/Vaping     E-Cigarette/Vaping Substances     Social History     Tobacco Use    Smoking status: Never Smoker    Smokeless tobacco: Never Used   Substance Use Topics    Alcohol use: Yes    Drug use: Never       Review of Systems   All other systems reviewed and are negative  Physical Exam  Physical Exam  Vitals and nursing note reviewed  Constitutional:       General: She is not in acute distress  Appearance: Normal appearance  She is well-developed  She is not ill-appearing, toxic-appearing or diaphoretic  HENT:      Head: Normocephalic and atraumatic  Right Ear: External ear normal       Left Ear: External ear normal       Nose: Nose normal    Eyes:      General:         Right eye: No discharge  Left eye: No discharge  Conjunctiva/sclera: Conjunctivae normal       Pupils: Pupils are equal, round, and reactive to light  Neck:      Vascular: No JVD  Cardiovascular:      Rate and Rhythm: Normal rate and regular rhythm  Heart sounds: Normal heart sounds  No murmur heard  No friction rub  No gallop  Pulmonary:      [place]ff[place]: Pulmonary effort is normal  No respiratory distress  Breath sounds: Normal breath sounds  No stridor  No wheezing, rhonchi or rales  Chest:      Chest wall: No tenderness  Abdominal:      General: Abdomen is flat  Bowel sounds are normal  There is no distension  Palpations: Abdomen is soft  There is no mass  Tenderness: There is no abdominal tenderness  There is no right CVA tenderness, left CVA tenderness, guarding or rebound  Hernia: No hernia is present  Musculoskeletal:         General: Tenderness present   No swelling, deformity or signs of injury  Normal range of motion  Cervical back: Normal range of motion and neck supple  Right lower leg: No edema  Left lower leg: No edema  Comments: tender to the left wrist ant and post, more focal tenderness to the radial aspect  Post finkelstein test  No swelling  Pain with passive rom of flex/ext  Pain referred to wrist with pronation and supination  Well perfused hand  Strong hand  and motor at wrist  sensation intact  Not swollen or red  Equally warm  Spine is NT   Skin:     General: Skin is warm and dry  Capillary Refill: Capillary refill takes less than 2 seconds  Coloration: Skin is not jaundiced or pale  Findings: No bruising, erythema, lesion or rash  Neurological:      General: No focal deficit present  Mental Status: She is alert and oriented to person, place, and time  Cranial Nerves: No cranial nerve deficit  Sensory: No sensory deficit  Motor: No weakness or abnormal muscle tone  Coordination: Coordination normal       Gait: Gait normal       Deep Tendon Reflexes: Reflexes normal    Psychiatric:         Mood and Affect: Mood normal          Vital Signs  ED Triage Vitals [06/20/22 1225]   Temperature Pulse Respirations Blood Pressure SpO2   98 3 °F (36 8 °C) 76 18 121/81 100 %      Temp Source Heart Rate Source Patient Position - Orthostatic VS BP Location FiO2 (%)   Oral Monitor Sitting -- --      Pain Score       6           Vitals:    06/20/22 1225   BP: 121/81   Pulse: 76   Patient Position - Orthostatic VS: Sitting         Visual Acuity      ED Medications  Medications   ketorolac (TORADOL) injection 30 mg (30 mg Intramuscular Given 6/20/22 1254)       Diagnostic Studies  Results Reviewed     None                 XR wrist 3+ views LEFT   Final Result by Neftaly Rincon MD (06/20 1309)      No acute osseous abnormality              Workstation performed: [id number]                    Procedures  Procedures         ED Course                               SBIRT 20yo+    Flowsheet Row Most Recent Value   SBIRT (23 yo +)    In order to provide better care to our patients, we are screening all of our patients for alcohol and drug use  Would it be okay to ask you these screening questions? Yes Filed at: 06/20/2022 1234   Initial Alcohol Screen: US AUDIT-C     1  How often do you have a drink containing alcohol? 0 Filed at: 06/20/2022 1234   2  How many drinks containing alcohol do you have on a typical day you are drinking? 0 Filed at: 06/20/2022 1234   3b  FEMALE Any Age, or MALE 65+: How often do you have 4 or more drinks on one occassion? 0 Filed at: 06/20/2022 1234   Audit-C Score 0 Filed at: 06/20/2022 1234   TONY: How many times in the past year have you    Used an illegal drug or used a prescription medication for non-medical reasons? Never Filed at: 06/20/2022 1234                    MDM    Disposition  Final diagnoses:   Wrist pain     Time reflects when diagnosis was documented in both MDM as applicable and the Disposition within this note     Time User Action Codes Description Comment    6/20/2022  1:12 PM Hugo Couch Add [M25 539] Wrist pain       ED Disposition     ED Disposition   Discharge    Condition   Stable    Date/Time   Mon Jun 20, 2022  1:12 PM    Comment   [facility] discharge to home/self care                 Follow-up Information     Follow up With Specialties Details Why Contact Info Additional [facility] Schedule an appointment as soon as possible for a visit in 3 days  [address] [facility],[address] [facility] [address] [facility] [address] [facility] Holliday, [address] [place] [address] [phone]          Discharge Medication List as of 6/20/2022  1:14 PM      START taking these medications    Details   ibuprofen (MOTRIN) 800 mg tablet Take 1 tablet (800 mg total) by mouth every 8 (eight) hours as needed for mild pain for up to 12 doses, Starting Mon 6/20/2022, Normal         CONTINUE these medications which have NOT CHANGED    Details   albuterol (Ventolin HFA) 90 mcg/act inhaler Inhale 2 puffs every 4 (four) hours, Starting Fri 8/14/2020, Normal      cloNIDine (CATAPRES) 0 1 mg tablet Take 0 1 mg by mouth 2 (two) times a day, Starting Wed 3/4/2020, Historical Med      desloratadine (CLARINEX) 5 MG tablet Take 1 tablet (5 mg total) by mouth daily as needed (Allergies), Starting Fri 8/14/2020, Normal      enoxaparin (LOVENOX) 40 mg/0 4 mL Starting Sat 5/30/2020, Historical Med      fluticasone-salmeterol (ADVAIR HFA) 115-21 MCG/ACT inhaler Inhale 2 puffs 2 (two) times a day Rinse mouth after use , Starting Fri 8/14/2020, Normal      ipratropium-albuterol (DUO-NEB) 0 5-2 5 mg/3 mL nebulizer solution Take 3 mL by nebulization 4 (four) times a day, Historical Med      mometasone (NASONEX) 50 mcg/act nasal spray 2 sprays into each nostril daily at bedtime as needed (Nasal congestion or postnasal drainage), Starting Fri 8/14/2020, Normal      ondansetron (ZOFRAN-ODT) 4 mg disintegrating tablet Starting Sat 5/30/2020, Historical Med      pantoprazole (PROTONIX) 40 mg tablet Take 40 mg by mouth, Starting Thu 6/4/2020, Until Fri 6/4/2021, Historical Med      scopolamine (TRANSDERM-SCOP) 1 5 mg/3 days TD 72 hr patch Starting Mon 6/1/2020, Historical Med             No discharge procedures on file      PDMP Review     None          ED Provider  Electronically Signed by           Naina Leger MD  06/22/22 1192

## 2022-06-20 NOTE — DISCHARGE INSTRUCTIONS
Return to er with swelling, redness, worsening pain or symptoms or with new symptoms  See ortho for follow up, call for appointment

## 2022-06-20 NOTE — ED TRIAGE NOTES
Anisha Hdez  #[id number] - Pt presents to the ED from home with complaint of left wrist pain; states "I had an accident at my job lifting boxes"; states was lifting boxes that "were very heavy"; nothing OTC for pain